# Patient Record
Sex: FEMALE | Race: WHITE | HISPANIC OR LATINO | Employment: OTHER | URBAN - METROPOLITAN AREA
[De-identification: names, ages, dates, MRNs, and addresses within clinical notes are randomized per-mention and may not be internally consistent; named-entity substitution may affect disease eponyms.]

---

## 2021-10-19 ENCOUNTER — TELEPHONE (OUTPATIENT)
Dept: NEUROLOGY | Facility: HOSPITAL | Age: 66
End: 2021-10-19

## 2021-10-20 ENCOUNTER — DOCUMENTATION ONLY (OUTPATIENT)
Dept: NEUROLOGY | Facility: HOSPITAL | Age: 66
End: 2021-10-20
Payer: MEDICARE

## 2021-11-05 ENCOUNTER — TELEPHONE (OUTPATIENT)
Dept: NEUROLOGY | Facility: HOSPITAL | Age: 66
End: 2021-11-05
Payer: MEDICARE

## 2021-11-18 ENCOUNTER — DOCUMENTATION ONLY (OUTPATIENT)
Dept: NEUROLOGY | Facility: HOSPITAL | Age: 66
End: 2021-11-18
Payer: MEDICARE

## 2021-11-23 DIAGNOSIS — C7A.019 MALIGNANT CARCINOID TUMOR OF SMALL INTESTINE, UNSPECIFIED LOCATION: Primary | ICD-10-CM

## 2021-12-01 ENCOUNTER — TELEPHONE (OUTPATIENT)
Dept: NEUROLOGY | Facility: HOSPITAL | Age: 66
End: 2021-12-01
Payer: MEDICARE

## 2022-01-13 ENCOUNTER — TELEPHONE (OUTPATIENT)
Dept: NEUROLOGY | Facility: HOSPITAL | Age: 67
End: 2022-01-13
Payer: MEDICARE

## 2022-01-13 NOTE — TELEPHONE ENCOUNTER
----- Message from Carolina Levi sent at 1/13/2022  2:31 PM CST -----  Contact: [place]( )[phone]  Type:  Needs Medical Advice    Who Called: Pt   Reason for call; regarding if the Dr looked at the pt's Scan and regarding moving forward  Would the patient rather a call back or a response via [facility]?  Call back  Best Call Back Number: [phone]

## 2022-02-03 ENCOUNTER — TELEPHONE (OUTPATIENT)
Dept: NEUROLOGY | Facility: HOSPITAL | Age: 67
End: 2022-02-03
Payer: MEDICARE

## 2022-02-03 NOTE — TELEPHONE ENCOUNTER
----- Message from Kory Rosales sent at 2/3/2022 11:08 AM CST -----  Contact: [phone]/self  Who Called: PT  Regarding: wanting to know if she has been accepted as a pt so she can schedule   Would the patient rather a call back or a response via [facility]? Call back  Best Call Back Number: [phone]  Additional Information: pt states she sent all information needed have not heard back

## 2022-02-04 NOTE — TELEPHONE ENCOUNTER
Returned patients call. Advised that Dr. Rivera attempted to review the scans, but he actually wants to see the scans from December 2021. Patient and spouse verbalized that they are going to work on getting the images from December sent to our office. All questions were answered at this time.

## 2022-02-04 NOTE — TELEPHONE ENCOUNTER
Spoke with patients spouse. He advised that patients most recent PET scan was from July 2021 and most recent CT Scan was from October 2021. Advised that we do have both sets of scan. I would have Dr. Rivera to review and get back to them with recommendations. Patient and spouse both verbalized their understanding.